# Patient Record
Sex: FEMALE | Race: WHITE
[De-identification: names, ages, dates, MRNs, and addresses within clinical notes are randomized per-mention and may not be internally consistent; named-entity substitution may affect disease eponyms.]

---

## 2018-07-06 ENCOUNTER — HOSPITAL ENCOUNTER (OUTPATIENT)
Dept: HOSPITAL 58 - RAD | Age: 43
Discharge: HOME | End: 2018-07-06
Attending: FAMILY MEDICINE

## 2018-07-06 DIAGNOSIS — M77.11: Primary | ICD-10-CM

## 2018-07-06 NOTE — DI
EXAM:  Two views of the right elbow. 

  

History:  Right elbow pain. 

  

Findings:  No acute fracture or dislocation.  No abnormal calcifications or radiopaque foreign bodies
.  Joint spaces are preserved.  No joint effusion. Seen on the frontal projection there is a question
able subtle cortical defect within the distal right humeral metaphysis ulnar side. 

  

Impression: 

1.  No acute osseous abnormality. 

2.  Questionable subtle cortical defect within the distal right humeral metaphysis ulnar side.  Corre
late with point tenderness, consider correlation with MRI or CT.